# Patient Record
Sex: MALE | Race: OTHER | Employment: FULL TIME | URBAN - METROPOLITAN AREA
[De-identification: names, ages, dates, MRNs, and addresses within clinical notes are randomized per-mention and may not be internally consistent; named-entity substitution may affect disease eponyms.]

---

## 2020-08-06 ENCOUNTER — HOSPITAL ENCOUNTER (EMERGENCY)
Age: 40
Discharge: HOME | End: 2020-08-06
Attending: EMERGENCY MEDICINE

## 2020-08-06 VITALS
HEIGHT: 63 IN | DIASTOLIC BLOOD PRESSURE: 94 MMHG | SYSTOLIC BLOOD PRESSURE: 167 MMHG | HEART RATE: 69 BPM | OXYGEN SATURATION: 100 % | TEMPERATURE: 98.5 F | BODY MASS INDEX: 32.78 KG/M2 | WEIGHT: 185 LBS | RESPIRATION RATE: 14 BRPM

## 2020-08-06 DIAGNOSIS — J02.9 PHARYNGITIS, UNSPECIFIED ETIOLOGY: Primary | ICD-10-CM

## 2020-08-06 LAB — DEPRECATED S PYO AG THROAT QL EIA: NEGATIVE

## 2020-08-06 PROCEDURE — 87880 STREP A ASSAY W/OPTIC: CPT

## 2020-08-06 PROCEDURE — 99283 EMERGENCY DEPT VISIT LOW MDM: CPT

## 2020-08-06 PROCEDURE — 87635 SARS-COV-2 COVID-19 AMP PRB: CPT

## 2020-08-06 PROCEDURE — 74011250637 HC RX REV CODE- 250/637: Performed by: EMERGENCY MEDICINE

## 2020-08-06 PROCEDURE — 87081 CULTURE SCREEN ONLY: CPT

## 2020-08-06 RX ORDER — DEXAMETHASONE SODIUM PHOSPHATE 100 MG/10ML
10 INJECTION INTRAMUSCULAR; INTRAVENOUS
Status: COMPLETED | OUTPATIENT
Start: 2020-08-06 | End: 2020-08-06

## 2020-08-06 RX ADMIN — DEXAMETHASONE SODIUM PHOSPHATE 10 MG: 10 INJECTION INTRAMUSCULAR; INTRAVENOUS at 10:39

## 2020-08-06 NOTE — ACP (ADVANCE CARE PLANNING)
COVID19 test acknowledged by HANS. Patient not seen for ACP discussing due to young age and health.      Berenice Bernstein, [place]    [address] [place] [address]    * [email or web address]    ( [phone]

## 2020-08-06 NOTE — ED NOTES
Patient sitting on side of bed in comfort. Patient in no acute distress. Patient with no further request at this time.

## 2020-08-06 NOTE — PROGRESS NOTES
present via phone conference during triage with unit nurse      [facility] Department  [address]  [facility]  [phone] (phone)   [email or web address]

## 2020-08-06 NOTE — ED PROVIDER NOTES
[facility] Emergency Department  Arrival Date/Time: 8/6/2020 @ [address] [place]   MRN: [id number]      47 y.o. male    YOB: 1980   No relevant phone numbers on file. [phone] (home)     [facility] EMERGENCY DEPT ER05/05  Seen on 8/6/2020 @ 10:03 AM       Chief Complaint   Patient presents with    Sore Throat     HPI: 29-year-old male presents to the emergency department with a sore throat. Onset this morning. No alleviating. No aggravating    No fever no chills. HPI    Historian: patient    Review of Systems: .addros     . [email or web address]   No fever  No chills  No vomiting    Allergies: No Known Allergies      Key Anti-Platelet Anticoagulant Meds     The patient is on no antiplatelet meds or anticoagulants. Physical Exam:  Nursing documentation reviewed. Vitals:    08/06/20 0951   BP: (!) 167/94   Pulse: 69   Resp: 14   Temp: 98.5 °F (36.9 °C)   SpO2: 100%    Vital signs were reviewed. Physical Exam  Vitals signs and nursing note reviewed. Constitutional:       Appearance: He is well-developed. HENT:      Head: Normocephalic. Nose: Congestion present. Mouth/Throat:      Pharynx: Posterior oropharyngeal erythema present. No oropharyngeal exudate. Cardiovascular:      Rate and Rhythm: Normal rate. Skin:     General: Skin is warm. Capillary Refill: Capillary refill takes less than 2 seconds. Neurological:      Mental Status: He is alert and oriented to person, place, and time. MEDICAL DECISION MAKING:     This is a new problem that does need additional workup  Labs/Radiographs/ECG were ordered: yes  Old records were reviewed:   CT/US/XRay/MRI were visualized by me:      The patient's problem is: minor  The Diagnostic Options are: minimal risk  The Management Options are: moderate risk     Data:    Lab findings during this visit (only abnormal values will be noted):   Labs Reviewed   STREP AG SCREEN, GROUP A   CULTURE, STREP THROAT   SARS-COV-2      Radiology studies during this visit: No results found. Medications given in the ED:   Medications   dexamethasone (DECADRON) 10 mg/mL Oral 10 mg (10 mg Oral Given 8/6/20 1039)        Recheck:   Resting. Strep is negative. COVID testing is pending    Start him on steroids. Encourage gargling. Motrin for fever chills aches and pains. Other ED Course Notes:        I wore appropriate PPE throughout this patient's ED encounter. Procedure Documentation: Procedures     Assessment and Plan:    Impression:     ICD-10-CM ICD-9-CM   1. Pharyngitis, unspecified etiology  J02.9 462      Condition at Discharge: improved  Disposition: stable  Follow-up:   Follow-up Information     Follow up With Specialties Details Why [facility] EMERGENCY DEPT Emergency Medicine  come back if you get worse or have any new problems [address] [place] [phone]         Discharge Medications: There are no discharge medications for this patient. 8/6/2020 12:12 PM    Past Medical History: Primary Care Doctor: None   History reviewed. No pertinent past medical history. History reviewed. No pertinent surgical history. Social History     Tobacco Use    Smoking status: Never Smoker    Smokeless tobacco: Never Used   Substance Use Topics    Alcohol use:  Yes    Drug use: Not on file      Home Medication:   None

## 2020-08-06 NOTE — ED TRIAGE NOTES
Pt given mask upon arrival to ED. Presents c/o sore throat x2 days with lingering cough over the last month. States that he ran a fever a month ago but recently has not. Denies diarrhea. All information obtained using Aleena Ruth interpretor.

## 2020-08-06 NOTE — ED NOTES
I have reviewed discharge instructions with the patient. The patient verbalized understanding. Patient left ED via Discharge Method: ambulatory to Home with self. Opportunity for questions and clarification provided. Patient given 0 scripts return to work note when known COVID results.  used during care. To continue your aftercare when you leave the hospital, you may receive an automated call from our care team to check in on how you are doing. This is a free service and part of our promise to provide the best care and service to meet your aftercare needs.  If you have questions, or wish to unsubscribe from this service please call [phone]. Thank you for Choosing our [facility] Emergency Department.

## 2020-08-06 NOTE — LETTER
[address] [address] EMERGENCY DEPT 
[address] 
[facility] [address] [phone] Work/School Note Date: 8/6/2020 To Whom It May concern: Dotti Denver was seen and treated today in the emergency room by the following provider(s): 
Attending Provider: Tam Patel MD. Dotti Denver may return to work when [place] results are available (typically 3-4 days).  
 
Sincerely, 
 
 
 
 
Mati Aquino RN 
 
 
 
 no

## 2020-08-06 NOTE — PROGRESS NOTES
present at bedside via phone conference during discharge instructions with unit nurse      [facility]  [address] [place]  [phone] (phone)   [email or web address]

## 2020-08-06 NOTE — PROGRESS NOTES
present at bedside via phone conference during assessment and testing with unit nurse      [facility]  [address]  [facility]  [phone] (phone)   [email or web address]

## 2020-08-07 ENCOUNTER — PATIENT OUTREACH (OUTPATIENT)
Dept: CASE MANAGEMENT | Age: 40
End: 2020-08-07

## 2020-08-07 LAB
SARS COV-2, XPGCVT: NEGATIVE
SOURCE, COVRS: NORMAL

## 2020-08-07 NOTE — PROGRESS NOTES
COVID Care Transitions    Attempted to reach patient by telephone. Number has been disconnected and is no longer in service. Could not leave message requesting a return call. Will not attempt to reach patient again. Episode resolved.

## 2020-08-08 LAB
BACTERIA SPEC CULT: NORMAL
SERVICE CMNT-IMP: NORMAL

## 2020-08-10 NOTE — PROGRESS NOTES
08/10/20 1st attempt to notify patient of negative Covid 19 result; number disconnected;  present for call; certified letter sent to patient